# Patient Record
Sex: FEMALE | URBAN - METROPOLITAN AREA
[De-identification: names, ages, dates, MRNs, and addresses within clinical notes are randomized per-mention and may not be internally consistent; named-entity substitution may affect disease eponyms.]

---

## 2018-11-16 ENCOUNTER — OFFICE VISIT (OUTPATIENT)
Dept: URBAN - METROPOLITAN AREA CLINIC 76 | Facility: CLINIC | Age: 48
End: 2018-11-16
Payer: COMMERCIAL

## 2018-11-16 DIAGNOSIS — H25.13 AGE-RELATED NUCLEAR CATARACT, BILATERAL: ICD-10-CM

## 2018-11-16 DIAGNOSIS — H52.4 PRESBYOPIA: Primary | ICD-10-CM

## 2018-11-16 PROCEDURE — 92014 COMPRE OPH EXAM EST PT 1/>: CPT | Performed by: OPTOMETRIST

## 2018-11-16 PROCEDURE — 92015 DETERMINE REFRACTIVE STATE: CPT | Performed by: OPTOMETRIST

## 2018-11-16 ASSESSMENT — VISUAL ACUITY
OD: 20/20
OS: 20/20

## 2018-11-16 ASSESSMENT — INTRAOCULAR PRESSURE
OS: 14
OD: 14

## 2018-11-16 ASSESSMENT — KERATOMETRY
OD: 43.38
OS: 44.50

## 2018-11-16 NOTE — IMPRESSION/PLAN
Impression: Presbyopia: H52.4. Plan: Discussed diagnosis with patient. Dispensed new MRx today with prism.